# Patient Record
Sex: FEMALE | Race: AMERICAN INDIAN OR ALASKA NATIVE
[De-identification: names, ages, dates, MRNs, and addresses within clinical notes are randomized per-mention and may not be internally consistent; named-entity substitution may affect disease eponyms.]

---

## 2019-04-22 ENCOUNTER — HOSPITAL ENCOUNTER (INPATIENT)
Dept: HOSPITAL 5 - LD | Age: 29
LOS: 4 days | Discharge: HOME | End: 2019-04-26
Attending: OBSTETRICS & GYNECOLOGY | Admitting: OBSTETRICS & GYNECOLOGY
Payer: COMMERCIAL

## 2019-04-22 DIAGNOSIS — Z3A.40: ICD-10-CM

## 2019-04-22 DIAGNOSIS — F32.9: ICD-10-CM

## 2019-04-22 DIAGNOSIS — Z83.3: ICD-10-CM

## 2019-04-22 DIAGNOSIS — K61.0: ICD-10-CM

## 2019-04-22 LAB
HCT VFR BLD CALC: 37.3 % (ref 30.3–42.9)
HGB BLD-MCNC: 11.6 GM/DL (ref 10.1–14.3)
MCHC RBC AUTO-ENTMCNC: 31 % (ref 30–34)
MCV RBC AUTO: 72 FL (ref 79–97)
PLATELET # BLD: 245 K/MM3 (ref 140–440)
RBC # BLD AUTO: 5.18 M/MM3 (ref 3.65–5.03)

## 2019-04-22 PROCEDURE — 88302 TISSUE EXAM BY PATHOLOGIST: CPT

## 2019-04-22 PROCEDURE — 86592 SYPHILIS TEST NON-TREP QUAL: CPT

## 2019-04-22 PROCEDURE — 36415 COLL VENOUS BLD VENIPUNCTURE: CPT

## 2019-04-22 PROCEDURE — 86850 RBC ANTIBODY SCREEN: CPT

## 2019-04-22 PROCEDURE — 85027 COMPLETE CBC AUTOMATED: CPT

## 2019-04-22 PROCEDURE — 86901 BLOOD TYPING SEROLOGIC RH(D): CPT

## 2019-04-22 PROCEDURE — 0UT70ZZ RESECTION OF BILATERAL FALLOPIAN TUBES, OPEN APPROACH: ICD-10-PCS | Performed by: OBSTETRICS & GYNECOLOGY

## 2019-04-22 PROCEDURE — 86900 BLOOD TYPING SEROLOGIC ABO: CPT

## 2019-04-22 PROCEDURE — 85014 HEMATOCRIT: CPT

## 2019-04-22 PROCEDURE — 85018 HEMOGLOBIN: CPT

## 2019-04-22 PROCEDURE — 0Y910ZZ DRAINAGE OF LEFT BUTTOCK, OPEN APPROACH: ICD-10-PCS | Performed by: OBSTETRICS & GYNECOLOGY

## 2019-04-22 RX ADMIN — AMPICILLIN SODIUM SCH MLS/HR: 1 INJECTION, POWDER, FOR SOLUTION INTRAMUSCULAR; INTRAVENOUS at 18:46

## 2019-04-22 RX ADMIN — ACETAMINOPHEN PRN MG: 325 TABLET ORAL at 16:53

## 2019-04-22 RX ADMIN — SODIUM CHLORIDE, SODIUM LACTATE, POTASSIUM CHLORIDE, AND CALCIUM CHLORIDE SCH MLS/HR: .6; .31; .03; .02 INJECTION, SOLUTION INTRAVENOUS at 14:23

## 2019-04-22 RX ADMIN — ACETAMINOPHEN PRN MG: 325 TABLET ORAL at 22:07

## 2019-04-22 RX ADMIN — CEFTRIAXONE SODIUM SCH MLS/HR: 2 INJECTION, POWDER, FOR SOLUTION INTRAMUSCULAR; INTRAVENOUS at 16:54

## 2019-04-22 NOTE — HISTORY AND PHYSICAL REPORT
History of Present Illness


Date of examination: 19 (pt here for IOl; lesion on perineum)


Date of admission: 


19 11:45





History of present illness: 


EDC Confirmation:  2019


Gestational Age:  25 1/7 weeks





Past Pregnancy History 


   :      3


   Term Births:      1


   Premature Births:   0


   Living Children:   1


   Para:         1


   Mult. Births:      0


   Prev :   0


   Prev.  attempt?   0


   Aborta:      1


   Elect. Ab:      0


   Spont. Ab:      1





Pregnancy # 1


   Delivery date:     


   Delivery type:     SAB


   Comments:      D&C





Pregnancy # 2


   Delivery date:     


   Weeks Gestation:   term


    labor:     no


   Delivery type:     


   Infant Sex:      Female


   Birth weight:      6#13








Past Medical History:


   anxeity/depression after first child


   HSV2


   Abnormal pap 10/2018 - colpo done 





Past Surgical History:


   Negative Past Surgical History





Past Medical History 


Surgery (Non-gyn): Negative Past Surgical History





Abnormal PAP: positive, 





Family Hx: mother - DM





Social Hx: Single


no ETOH/drugs/smoking





Infection History 


Hx of STD: HSV 2


HIV Risk Eval: no


Hepatitis B Risk Eval: low risk


Personal hx. of genital herpes: yes


Partner hx. of genital herpes: no


Rash, Viral, or Febrile illness since last LMP? no


Varicella/Chicken Pox Status: Previous Disease





Genetic History 


 Congenital Heart Defect:


    Mom: no  Dad: no


Canavan Disease:


    Mom: no  Dad: no


Thalassemia


    Mom: no  Dad: no


Neural Tube Defect


    Mom: no  Dad: no


Down's Syndrome


    Mom: no  Dad: no


Ricky-Sachs


    Mom: no  Dad: no


Sickle Cell Disease/Trait


    Mom: no  Dad: no


Hemophilia


    Mom: no  Dad: no


Muscular Dystrophy


    Mom: no  Dad: no


Cystic Fibrosis


    Mom: no  Dad: no


Loving Chorea


    Mom: no  Dad: no


Mental Retardation


    Mom: no  Dad: no


Fragile X


    Mom: no  Dad: no


Other Genetic/Chromosomal Disorder


    Mom: no  Dad: no


Child w/other birth defect


    Mom: no  Dad: no





Enviromental Exposures 


Xray Exposure: no


Medication, drug, or alcohol use since LMP: no


Chemical/Other Exposure: no


Exposure to Cat Liter: no


Hx of Parvovirus (Fifth Disease): no


Occupational Exposure to Children: none


Current Allergies (reviewed today):


No known allergies








Past History





- Obstetrical History


Expected Date of Delivery: 19


Actual Gestation: 40 Week(s) 5 Day(s) 


: 3


Para: 1


Hx # Term Pregnancies: 1


Number of  Pregnancies: 0


Spontaneous Abortions: 0


Induced : 0


Number of Living Children: 1





- Vital Signs


Vital signs: 


                                   Vital Signs











Pulse BP


 


 125 H  132/81 


 


 19 12:16  19 12:16








                                        











Temp Pulse Resp BP Pulse Ox


 


    125 H     132/81    


 


    19 12:16     19 12:16   














- Physical Exam


Breasts: Positive: deferred


Cardiovascular: Regular rate, Normal S1, Normal S2


Lungs: Positive: Normal air movement


Abdomen: Positive: normal appearance, soft, normal bowel sounds.  Negative: 

distention, tenderness


Genitourinary (Female): Positive: other (there is a lesion on her left buttock 

that measures approx 2-3cm X 1.5 cm)


Vulva: both: normal


Vagina: Positive: normal moisture.  Negative: discharge


Cervix: Negative: lesion, discharge


Uterus: Positive: normal size, normal contour


Adnexa: both: normal


Anus/Rectum: Positive: normal perianal skin, heme negative.  Negative: rectal 

mass, hemorrhoids


Extremities: Positive: normal


Deep Tendon Reflex Grade: Normal +2





- Obstetrical


FHR: category 1


Uterine Contraction Monitor Mode: External


Uterine Contraction Pattern: Irregular


Uterine Tone Measurement Phase: Resting


Uterine Contraction Intensity: Mild





Results


All other labs normal.


Laboratory Data-Patient Name: JAMES SALAZAR               











Test Date Result


 


Blood Type 10/22/2018 O


 


Rh 10/22/2018 positive


 


Antibody Screen  


 


Rubella 10/22/2018 IMMUNE


 


Serology ([facility]PR) 2019 NR


 


HBsAg 10/22/2018 negative


 


Hemoglobin 10/22/2018 11.1


 


Hematocrit 10/22/2018 37.9


 


Platelets 10/22/2018 253


 


Chlamydia DNA 10/20/2018 negative


 


GC DNA/Culture 10/20/2018 


 


Urine Culture  


 


Group B Strep cult  POSITIVE


 


PAP 10/20/2018 Satisfactory, LSIL


 


HIV 2019 Negative


 


AFP/Quad Screen  


 


Glucola Test  


 


3hr GTT (Fasting)  


 


  1 hr 2018 121


 


  2 hr  


 


  3 hr  











Assessment and Plan


pt sent from office for IOL after being seen for her OB visit. Pt also has a l

esion on her left buttock Noted by . IV Rocephin ordered. Surgery 

consult placed GBS + Ampicillin ordered. Orders in EMR

## 2019-04-22 NOTE — CONSULTATION
History of Present Illness


Consult date: 04/22/19


Reason for consult: other (abscess)


Requesting physician: PACO CHA


Chief complaint: 





perineal pain and swelling





- History of present illness


History of present illness: 





29yo F, otherwise healthy, was admitted due to abscess in left buttock and plans

for induction.  Patient reports the pain and swelling have been present for at 

least one week.  She thought it was a cyst.  It has persisted and gotten worse. 

Denies any fevers or chills.  She is not had anything like this before.





Past History


Past Medical History: No medical history


Past Surgical History: No surgical history


Social history: denies: smoking, alcohol abuse


Family history: no significant family history





Medications and Allergies


                                    Allergies











Allergy/AdvReac Type Severity Reaction Status Date / Time


 


No Known Allergies Allergy   Unverified 04/22/19 13:34











Active Meds: 


Active Medications





Acetaminophen (Tylenol)  650 mg PO Q4H PRN


   PRN Reason: Pain, Mild (1-3)


   Last Admin: 04/22/19 16:53 Dose:  650 mg


   Documented by: 


Benzocaine/Menthol (Dermoplast)  1 spray TP PRN PRN


   PRN Reason: Vaginal Irritation


Ephedrine Sulfate (Ephedrine Sulfate)  10 mg IV Q2M PRN


   PRN Reason: Hypotension


Fentanyl (Sublimaze)  100 mcg IV Q2H PRN


   PRN Reason: Labor Pain


Ampicillin Sodium (Ampicillin/Ns 1 Gm/50 Ml)  1 gm in 50 mls @ 100 mls/hr IV 

Q4HR ONEYDA; Protocol


   Last Admin: 04/22/19 18:46 Dose:  100 mls/hr


   Documented by: 


Ceftriaxone Sodium (Rocephin/Ns 2 Gm/100 Ml)  2 gm in 100 mls @ 200 mls/hr IV 

Q24HR ONEYDA; Protocol


   Last Admin: 04/22/19 16:54 Dose:  200 mls/hr


   Documented by: 


Lactated Ringer's (Lactated Ringers)  1,000 mls @ 125 mls/hr IV AS DIRECT ONEYDA


   Last Admin: 04/22/19 14:23 Dose:  125 mls/hr


   Documented by: 


Oxytocin/Sodium Chloride (Pitocin/Ns 20 Unit/1000ml Drip)  20 units in 1,000 mls

@ 125 mls/hr IV AS DIRECT ONEYDA


Oxytocin/Sodium Chloride (Pitocin/Ns 30 Unit/500ml)  30 units in 500 mls @ 4 

mls/hr IV AS DIRECT ONEYDA; Protocol


   Last Admin: 04/22/19 14:31 Dose:  2 mls/hr, 2 mls/hr


   Documented by: 


Mineral Oil (Mineral Oil)  30 ml PO QHS PRN


   PRN Reason: Constipation


Ondansetron HCl (Zofran)  4 mg IV Q8H PRN


   PRN Reason: Nausea And Vomiting


Terbutaline Sulfate (Brethine)  0.25 mg SUB-Q ONCE PRN


   PRN Reason: Hyperstimulation/Hypertonicity


Terbutaline Sulfate (Brethine)  0.25 mg IVP ONCE PRN


   PRN Reason: Hyperstimulation/Hypertonicity











Review of Systems





- Constitutional


no fever, no chills, no chronic pain





- Cardiovascular


no chest pain





- Respiratory


no cough





- Gastrointestinal


no abdominal pain





- Integumentary


boils





- Psychiatric


depression





Exam


                                   Vital Signs











Temp Pulse BP


 


 98.6 F   125 H  132/81 


 


 04/22/19 12:16  04/22/19 12:16  04/22/19 12:16














- General physical appearance


Positive: no distress, obese





- Eyes


Positive: normal occular movement





- Respiratory


Positive: normal expansion, normal respiratory effort





- Integumentary


other (1cm fluctuant area in left perineal area with surrounding induration. 

+warmth. +tenderness. No extension to labia. No active drainage)





- Neurologic


Neurologic: alert and oriented to time, place and person, motor strength and 

sensation are grossly intact





- Psychiatric


Psychiatric: appropriate mood/affect, intact judgment & insight





Results





- Labs





                                 04/22/19 13:21





                              Abnormal lab results











  04/22/19 Range/Units





  13:21 


 


WBC  13.9 H  (4.5-11.0)  K/mm3


 


RBC  5.18 H  (3.65-5.03)  M/mm3


 


MCV  72 L  (79-97)  fl


 


MCH  23 L  (28-32)  pg


 


RDW  17.4 H  (13.2-15.2)  %














Assessment and Plan





- Patient Problems


(1) Abscess of perineum


Current Visit: Yes   Status: Acute   


Plan to address problem: 


Pt stable.  Discussed plan with Dr. Cha.  They are planning to begin 

induction tonight.  After the delivery is complete, they will notify me so that 

I can make arrangements for a bedside I&D.  This was explained to the patient.  

She was in agreement with the plan.  Will obtain written consent at the time of 

the I&D.





Please call with questions. 





Time=30min

## 2019-04-23 RX ADMIN — CEFTRIAXONE SODIUM SCH: 2 INJECTION, POWDER, FOR SOLUTION INTRAMUSCULAR; INTRAVENOUS at 13:52

## 2019-04-23 RX ADMIN — SODIUM CHLORIDE, SODIUM LACTATE, POTASSIUM CHLORIDE, AND CALCIUM CHLORIDE SCH MLS/HR: .6; .31; .03; .02 INJECTION, SOLUTION INTRAVENOUS at 12:45

## 2019-04-23 RX ADMIN — DEXTROSE, SODIUM CHLORIDE, SODIUM LACTATE, POTASSIUM CHLORIDE, AND CALCIUM CHLORIDE SCH MLS/HR: 5; .6; .31; .03; .02 INJECTION, SOLUTION INTRAVENOUS at 22:54

## 2019-04-23 RX ADMIN — CEFTRIAXONE SODIUM SCH MLS/HR: 2 INJECTION, POWDER, FOR SOLUTION INTRAMUSCULAR; INTRAVENOUS at 13:52

## 2019-04-23 RX ADMIN — ACETAMINOPHEN PRN MG: 325 TABLET ORAL at 04:30

## 2019-04-23 RX ADMIN — SODIUM CHLORIDE, SODIUM LACTATE, POTASSIUM CHLORIDE, AND CALCIUM CHLORIDE SCH MLS/HR: .6; .31; .03; .02 INJECTION, SOLUTION INTRAVENOUS at 09:38

## 2019-04-23 RX ADMIN — AMPICILLIN SODIUM SCH MLS/HR: 1 INJECTION, POWDER, FOR SOLUTION INTRAMUSCULAR; INTRAVENOUS at 10:41

## 2019-04-23 RX ADMIN — KETOROLAC TROMETHAMINE PRN MG: 30 INJECTION, SOLUTION INTRAMUSCULAR at 23:00

## 2019-04-23 NOTE — OPERATIVE REPORT
Operative Report


Operative Report: 


Date of procedure: 2019





Pre-operative diagnosis: Intrauterine pregnancy at 40 weeks and 6 days with arre

st of dilatation and descent and desires permanent sterilization.  Perianal 

abscess 





Post-operative diagnosis: Same





Procedure name(s): Primary  section with bilateral tubal ligation 

modified Perris type plus packing of perianal abscess





Surgeon: Demetrio Melendez MD





Assistant: Virginia Soler, certified nurse midwife





Anesthesia: Epidural





EBL: 800 mL





Complications: None





Findings: Normal uterus tubes and ovaries bilaterally.  Meconium stained fluid. 

Female infant Apgars 4 at 1 minute and 7 at 5 minutes.





Specimen(s): Portion of right and left fallopian tubes





Procedure: The patient was brought to the operating room.  Her epidural was 

dosed without any complications.  She was then placed in left lateral tilt.  

Prepped and draped in the usual sterile manner.  After testing for adequate 

anesthesia level, a Pfannenstiel incision was made. This incision was taken down

to the fascia.  The fascia was then nicked in the midline.  This incision was 

extended out laterally with Gonzalez scissors.  The fascia was then  

sharply and bluntly from the underlying rectus muscles.  The rectus muscles were

bluntly and sharply .  The peritoneum was then entered with the 

's fingers.  This incision was spread vertically with care not to damage

the bladder below. The Amos self-retaining tractor was then placed without any

difficulty.  The bladder flap was then formed sharply and bluntly with 

Metzenbaum scissors.  A transverse incision was made in lower uterine segment.  

This incision was extended laterally with the operators fingers.  The amniotic 

sac was then entered bluntly with the 's fingers.  The infant was 

delivered from the vertex position.  Bulb suction on the mother's abdomen.  Cord

was double clamped and cut.  The infant was then passed to the nursery personnel

who were in attendance.  The above Apgar scores were given by the nursery 

personnel.  The placenta was then bluntly removed.  The uterus was then 

externalized and wiped clean the remaining products.  The uterine incision was 

closed in layers.  The first incision was closed in a locking manner using 0 

Vicryl.  This was followed by imbricating stitch also with 0 Vicryl.  Attention 

was then switched to the patient's fallopian tubes.  Each fallopian tube was 

identified by its fimbriated end.  A portion of each tube was grabbed with the 

Hurst clamp approximately 2-3 cm from the cornua.  Each loop was double 

ligated with 0 plain suture.  The loop were cut with Metzenbaum scissors.  Each 

stump was found to be hemostatic and cauterized with the Bovie.  Attention was 

then switched back to the uterine closure. This closure was hemostatic.  The 

bladder flap was copiously irrigated and found to be hemostatic. The pelvis was 

copiously irrigated and found to be hemostatic.  The uterus was then placed back

to the patient's abdomen.  The retractors were removed.  The rectus muscles were

inspected and found to be hemostatic.  The fascia was then closed in a running 

manner using 0 Vicryl.  This incision was hemostatic irrigation Bovie.  The skin

was reapproximated with 4-0 Vicryl subcuticularly.  Attention was then turned to

the patient's left buttocks where patient had a spontaneously draining abscess. 

The abscess was probed with a hemostat with expressing a minimal amount of pus. 

The abscess was then packed with one-inch gauze.  The area did have significant 

induration. The patient tolerated procedure well.  Her urine was clear.  The 

infant was admitted to the intensive care nursery for observation. The patient 

was accompanied to recovery room in good condition.  Instrument count correct 

3.

## 2019-04-23 NOTE — PROGRESS NOTE
Assessment and Plan


 Pt has not made any progress in dilatation or decent since noon despite 

adequate labor and frequent position change. FHT CAT 1 with early decels. EFW 8+

lbs. DWP advisement for operative delivery d/t lack of cervical change and 

decent with adequate labor. Reviewed c/s is major abd surgery; possible risk for

injury to surrounding organs and blood loss. Pitocin turned off. pt given time 

to discuss plan with family.








- Patient Problems


(1) 40 weeks gestation of pregnancy


Current Visit: Yes   Status: Acute   





(2) Abscess of perineum


Current Visit: Yes   Status: Acute   





Subjective





- Subjective


Date of service: 04/23/19


Principal diagnosis: IUP @ 40+6, IOL, perianal abcess


Patient reports: no new complaints (comfortable with epidural, feeling rectal 

pressure)





Objective





- Vital Signs


Vital Signs: 


                               Vital Signs - 12hr











  04/23/19 04/23/19 04/23/19





  06:40 07:21 09:18


 


Temperature  97.1 F L 


 


Pulse Rate 86 82 100 H


 


Respiratory  20 





Rate   


 


Blood Pressure 124/79 112/57 133/74


 


O2 Sat by Pulse   





Oximetry   














  04/23/19 04/23/19 04/23/19





  09:29 09:34 09:39


 


Temperature   


 


Pulse Rate 107 H 101 H 87


 


Respiratory   





Rate   


 


Blood Pressure   


 


O2 Sat by Pulse 98 100 100





Oximetry   














  04/23/19 04/23/19 04/23/19





  09:44 09:49 09:54


 


Temperature   


 


Pulse Rate 111 H 109 H 103 H


 


Respiratory   





Rate   


 


Blood Pressure   


 


O2 Sat by Pulse 97 99 98





Oximetry   














  04/23/19 04/23/19 04/23/19





  09:59 10:15 10:20


 


Temperature   


 


Pulse Rate 107 H  115 H


 


Respiratory   





Rate   


 


Blood Pressure   


 


O2 Sat by Pulse 100 100 98





Oximetry   














  04/23/19 04/23/19 04/23/19





  10:25 10:28 10:30


 


Temperature   


 


Pulse Rate 95 H 105 H 123 H


 


Respiratory   





Rate   


 


Blood Pressure  134/61 134/64


 


O2 Sat by Pulse 97  98





Oximetry   














  04/23/19 04/23/19 04/23/19





  10:32 10:34 10:35


 


Temperature   


 


Pulse Rate 112 H 111 H 118 H


 


Respiratory   





Rate   


 


Blood Pressure 127/63 125/63 


 


O2 Sat by Pulse   97





Oximetry   














  04/23/19 04/23/19 04/23/19





  10:36 10:40 10:42


 


Temperature   


 


Pulse Rate 112 H 128 H 117 H


 


Respiratory   





Rate   


 


Blood Pressure 119/62 117/56 113/57


 


O2 Sat by Pulse  96 





Oximetry   














  04/23/19 04/23/19 04/23/19





  10:44 10:45 10:48


 


Temperature   


 


Pulse Rate 126 H 130 H 121 H


 


Respiratory   





Rate   


 


Blood Pressure 88/49 97/46 98/44


 


O2 Sat by Pulse  99 





Oximetry   














  04/23/19 04/23/19 04/23/19





  10:50 10:52 10:54


 


Temperature   


 


Pulse Rate 127 H 148 H 125 H


 


Respiratory   





Rate   


 


Blood Pressure 106/52 94/53 107/61


 


O2 Sat by Pulse 100  





Oximetry   














  04/23/19 04/23/19 04/23/19





  10:55 10:56 10:58


 


Temperature   


 


Pulse Rate 101 H  100 H


 


Respiratory   





Rate   


 


Blood Pressure  109/58 117/58


 


O2 Sat by Pulse 100  





Oximetry   














  04/23/19 04/23/19 04/23/19





  11:00 11:02 11:04


 


Temperature   


 


Pulse Rate 124 H 108 H 


 


Respiratory   





Rate   


 


Blood Pressure 113/56 124/58 126/59


 


O2 Sat by Pulse 100  





Oximetry   














  04/23/19 04/23/19 04/23/19





  11:05 11:06 11:08


 


Temperature   


 


Pulse Rate 117 H 122 H 141 H


 


Respiratory   





Rate   


 


Blood Pressure  110/55 111/59


 


O2 Sat by Pulse 100  





Oximetry   














  04/23/19 04/23/19 04/23/19





  11:10 11:12 11:14


 


Temperature   97.7 F


 


Pulse Rate 111 H 121 H 105 H


 


Respiratory   





Rate   


 


Blood Pressure 121/60 113/62 113/66


 


O2 Sat by Pulse 100  100





Oximetry   














  04/23/19 04/23/19 04/23/19





  11:15 11:16 11:18


 


Temperature   


 


Pulse Rate 124 H 123 H 130 H


 


Respiratory   





Rate   


 


Blood Pressure  118/72 110/56


 


O2 Sat by Pulse 100  





Oximetry   














  04/23/19 04/23/19 04/23/19





  11:20 11:21 11:22


 


Temperature   


 


Pulse Rate 118 H 120 H 106 H


 


Respiratory   





Rate   


 


Blood Pressure  119/58 121/59


 


O2 Sat by Pulse 100  





Oximetry   














  04/23/19 04/23/19 04/23/19





  11:24 11:25 11:26


 


Temperature   


 


Pulse Rate 130 H 142 H 133 H


 


Respiratory   





Rate   


 


Blood Pressure 115/54  92/57


 


O2 Sat by Pulse  100 





Oximetry   














  04/23/19 04/23/19 04/23/19





  11:28 11:30 11:32


 


Temperature   


 


Pulse Rate 112 H 106 H 118 H


 


Respiratory   





Rate   


 


Blood Pressure 105/61 109/59 115/60


 


O2 Sat by Pulse  99 





Oximetry   














  04/23/19 04/23/19 04/23/19





  11:34 11:35 11:36


 


Temperature   


 


Pulse Rate 127 H 100 H 108 H


 


Respiratory   





Rate   


 


Blood Pressure 111/59  117/63


 


O2 Sat by Pulse  100 





Oximetry   














  04/23/19 04/23/19 04/23/19





  11:38 11:40 11:42


 


Temperature   


 


Pulse Rate 114 H 134 H 106 H


 


Respiratory   





Rate   


 


Blood Pressure 117/62 107/53 123/60


 


O2 Sat by Pulse  99 





Oximetry   














  04/23/19 04/23/19 04/23/19





  11:44 11:45 11:46


 


Temperature   


 


Pulse Rate 112 H 116 H 113 H


 


Respiratory   





Rate   


 


Blood Pressure 119/61  119/56


 


O2 Sat by Pulse  100 





Oximetry   














  04/23/19 04/23/19 04/23/19





  11:48 11:50 11:52


 


Temperature   


 


Pulse Rate 127 H 109 H 116 H


 


Respiratory   





Rate   


 


Blood Pressure 114/57 120/61 121/60


 


O2 Sat by Pulse  99 





Oximetry   














  04/23/19 04/23/19 04/23/19





  11:54 11:55 11:56


 


Temperature   


 


Pulse Rate 117 H 129 H 104 H


 


Respiratory   





Rate   


 


Blood Pressure 122/57  127/60


 


O2 Sat by Pulse  100 





Oximetry   














  04/23/19 04/23/19 04/23/19





  11:58 12:00 12:02


 


Temperature   


 


Pulse Rate 116 H 119 H 121 H


 


Respiratory   





Rate   


 


Blood Pressure 130/62 135/62 144/64


 


O2 Sat by Pulse  100 





Oximetry   














  04/23/19 04/23/19 04/23/19





  12:04 12:05 12:06


 


Temperature   


 


Pulse Rate 108 H 120 H 116 H


 


Respiratory   





Rate   


 


Blood Pressure 137/63  138/65


 


O2 Sat by Pulse  99 





Oximetry   














  04/23/19 04/23/19 04/23/19





  12:08 12:10 12:12


 


Temperature   


 


Pulse Rate 116 H 111 H 110 H


 


Respiratory   





Rate   


 


Blood Pressure 143/70 138/65 126/73


 


O2 Sat by Pulse  99 





Oximetry   














  04/23/19 04/23/19 04/23/19





  12:14 12:15 12:17


 


Temperature   


 


Pulse Rate 116 H 142 H 113 H


 


Respiratory   





Rate   


 


Blood Pressure 128/75  109/60


 


O2 Sat by Pulse  96 





Oximetry   














  04/23/19 04/23/19 04/23/19





  12:20 12:25 12:30


 


Temperature   


 


Pulse Rate 113 H 112 H 122 H


 


Respiratory   





Rate   


 


Blood Pressure 106/56  


 


O2 Sat by Pulse 100 99 98





Oximetry   














  04/23/19 04/23/19 04/23/19





  12:35 12:40 12:45


 


Temperature   


 


Pulse Rate 120 H 118 H 111 H


 


Respiratory   





Rate   


 


Blood Pressure   


 


O2 Sat by Pulse 99 99 99





Oximetry   














  04/23/19 04/23/19 04/23/19





  12:46 12:50 12:51


 


Temperature 97.8 F  


 


Pulse Rate  111 H 111 H


 


Respiratory 20  





Rate   


 


Blood Pressure   117/61


 


O2 Sat by Pulse  99 





Oximetry   














  04/23/19 04/23/19 04/23/19





  12:55 13:00 13:05


 


Temperature   


 


Pulse Rate 105 H 113 H 124 H


 


Respiratory   





Rate   


 


Blood Pressure   


 


O2 Sat by Pulse 98 99 100





Oximetry   














  04/23/19 04/23/19 04/23/19





  13:10 13:15 13:20


 


Temperature   


 


Pulse Rate 114 H 112 H 115 H


 


Respiratory   





Rate   


 


Blood Pressure   


 


O2 Sat by Pulse 99 100 100





Oximetry   














  04/23/19 04/23/19 04/23/19





  13:22 13:25 13:40


 


Temperature   


 


Pulse Rate 111 H 109 H 117 H


 


Respiratory   





Rate   


 


Blood Pressure 135/68  


 


O2 Sat by Pulse  100 100





Oximetry   














  04/23/19 04/23/19 04/23/19





  13:45 13:50 13:52


 


Temperature   


 


Pulse Rate 113 H 116 H 112 H


 


Respiratory   





Rate   


 


Blood Pressure   127/81


 


O2 Sat by Pulse 100 100 





Oximetry   














  04/23/19 04/23/19 04/23/19





  13:55 14:00 14:05


 


Temperature   


 


Pulse Rate 109 H 115 H 115 H


 


Respiratory   





Rate   


 


Blood Pressure   


 


O2 Sat by Pulse 100 99 100





Oximetry   














  04/23/19 04/23/19 04/23/19





  14:10 14:15 14:20


 


Temperature   


 


Pulse Rate 114 H 123 H 114 H


 


Respiratory   





Rate   


 


Blood Pressure   


 


O2 Sat by Pulse 100 100 100





Oximetry   














  04/23/19 04/23/19 04/23/19





  14:21 14:25 14:30


 


Temperature   


 


Pulse Rate 111 H 117 H 116 H


 


Respiratory   





Rate   


 


Blood Pressure 142/75  


 


O2 Sat by Pulse  100 99





Oximetry   














  04/23/19 04/23/19 04/23/19





  14:35 14:40 14:45


 


Temperature   


 


Pulse Rate 129 H 111 H 109 H


 


Respiratory   





Rate   


 


Blood Pressure   


 


O2 Sat by Pulse 100 100 99





Oximetry   














  04/23/19 04/23/19 04/23/19





  14:50 14:53 14:55


 


Temperature   


 


Pulse Rate 108 H 108 H 108 H


 


Respiratory   





Rate   


 


Blood Pressure  115/60 


 


O2 Sat by Pulse 99  99





Oximetry   














  04/23/19 04/23/19 04/23/19





  15:00 15:05 15:10


 


Temperature   


 


Pulse Rate 113 H 104 H 102 H


 


Respiratory   





Rate   


 


Blood Pressure   


 


O2 Sat by Pulse 98 99 99





Oximetry   














  04/23/19 04/23/19 04/23/19





  15:15 15:20 15:23


 


Temperature   


 


Pulse Rate 106 H 107 H 107 H


 


Respiratory   





Rate   


 


Blood Pressure   125/61


 


O2 Sat by Pulse 98 99 





Oximetry   














  04/23/19 04/23/19 04/23/19





  15:25 15:30 15:35


 


Temperature   


 


Pulse Rate 103 H 109 H 98 H


 


Respiratory   





Rate   


 


Blood Pressure   


 


O2 Sat by Pulse 99 100 100





Oximetry   














  04/23/19 04/23/19 04/23/19





  15:40 15:45 15:50


 


Temperature   


 


Pulse Rate 99 H 107 H 101 H


 


Respiratory   





Rate   


 


Blood Pressure   


 


O2 Sat by Pulse 100 100 100





Oximetry   














  04/23/19 04/23/19 04/23/19





  15:51 15:55 16:00


 


Temperature   


 


Pulse Rate 108 H 102 H 108 H


 


Respiratory   





Rate   


 


Blood Pressure 134/88  


 


O2 Sat by Pulse  100 100





Oximetry   














  04/23/19 04/23/19 04/23/19





  16:05 16:10 16:15


 


Temperature   


 


Pulse Rate 107 H 117 H 103 H


 


Respiratory   





Rate   


 


Blood Pressure   


 


O2 Sat by Pulse 100 100 100





Oximetry   














  04/23/19 04/23/19 04/23/19





  16:20 16:22 16:25


 


Temperature   


 


Pulse Rate 100 H 112 H 112 H


 


Respiratory   





Rate   


 


Blood Pressure  127/81 


 


O2 Sat by Pulse 99  100





Oximetry   














  04/23/19 04/23/19 04/23/19





  16:30 16:35 16:40


 


Temperature   


 


Pulse Rate 110 H 123 H 125 H


 


Respiratory   





Rate   


 


Blood Pressure   


 


O2 Sat by Pulse 100 99 99





Oximetry   














  04/23/19 04/23/19 04/23/19





  16:45 16:50 16:52


 


Temperature   


 


Pulse Rate 122 H 125 H 120 H


 


Respiratory   





Rate   


 


Blood Pressure   117/78


 


O2 Sat by Pulse 100 97 





Oximetry   














- Exam


Breasts: normal


Cardiovascular: Regular rate


Lungs: Clear to auscultation, Normal air movement


Abdomen: Present: normal appearance, soft


Vulva: both: normal (perianal cyst now with small amount of purulent drainage)


Uterus: Present: normal


FHR: auscultation normal, category 1


Uterine Contraction Monitor Mode: Internal


Cervical Dilatation: 7


Cervical Effacement Percentage: 80


Fetal station: -1


Uterine Contraction Frequency (min): 2


Uterine Contraction Duration: 60


Uterine Contraction Pattern: Regular


Uterine Tone Measurement Phase: Contraction


Uterine Contraction Intensity: Strong/Firm





- Labs


Labs: 


                                  Abnormal Labs











  04/22/19





  13:21


 


WBC  13.9 H


 


RBC  5.18 H


 


MCV  72 L


 


MCH  23 L


 


RDW  17.4 H








                         Laboratory Results - last 24 hr











  04/22/19





  13:21


 


RPR  Nonreactive

## 2019-04-23 NOTE — ANESTHESIA CONSULTATION
Anesthesia Consult and Med Hx





- Airway


Anesthetic Teeth Evaluation: Good


ROM Head & Neck: Adequate


Mental/Hyoid Distance: Adequate


Mallampati Class: Class II


Intubation Access Assessment: Probably Good





- Pulmonary Exam


CTA: Yes





- Cardiac Exam


Cardiac Exam: RRR





- Pre-Operative Health Status


ASA Pre-Surgery Classification: ASA2


Proposed Anesthetic Plan: Epidural





- Pulmonary


Hx Asthma: No


COPD: No


Hx Pneumonia: No





- Cardiovascular System


Hx Hypertension: No





- Central Nervous System


Hx Seizures: No


Hx Psychiatric Problems: No





- Endocrine


Hx Renal Disease: No


Hx End Stage Renal Disease: No


Hx Hypothyroidism: No


Hx Hyperthyroidism: No





- Hematic


Hx Anemia: Yes


Hx Sickle Cell Disease: No





- Other Systems


Hx Alcohol Use: No

## 2019-04-23 NOTE — PROGRESS NOTE
Assessment and Plan


 Cervidil removed - SVE 4/70/-3, IBOW. Plan reviewed to start pitocin and 

epidural. Will call surgery once pt delivered for I&D of abcess.  Plan of care 

discussed with patient. 








- Patient Problems


(1) 40 weeks gestation of pregnancy


Current Visit: Yes   Status: Acute   





(2) Abscess of perineum


Current Visit: Yes   Status: Acute   





Subjective





- Subjective


Date of service: 04/23/19


Principal diagnosis: IUP @ 40+6, IOL, perianal abcess


Patient reports: fetal movement normal, contractions, no loss of fluid, no 

vaginal bleeding





Objective





- Vital Signs


Vital Signs: 


                               Vital Signs - 12hr











  04/22/19 04/23/19 04/23/19





  20:42 06:40 07:21


 


Temperature   97.1 F L


 


Pulse Rate 105 H 86 82


 


Blood Pressure 110/59 124/79 112/57














- Exam


Breasts: normal


Cardiovascular: Regular rate


Lungs: Clear to auscultation, Normal air movement


Abdomen: Present: normal appearance, soft


Vulva: left: normal (abcess on left side of vulva - white head formed on top of 

abcess)


Uterus: Present: normal


FHR: auscultation normal, category 1


Uterine Contraction Monitor Mode: External


Cervical Dilatation: 4


Cervical Effacement Percentage: 70


Fetal station: -3


Uterine Contraction Frequency (min): 2-4


Uterine Contraction Duration: 60


Uterine Contraction Pattern: Regular


Uterine Tone Measurement Phase: Contraction


Uterine Contraction Intensity: Moderate


Extremities: normal


Deep Tendon Reflex Grade: Normal +2





- Labs


Labs: 


                                  Abnormal Labs











  04/22/19





  13:21


 


WBC  13.9 H


 


RBC  5.18 H


 


MCV  72 L


 


MCH  23 L


 


RDW  17.4 H








                         Laboratory Results - last 24 hr











  04/22/19 04/22/19





  13:21 13:21


 


WBC  13.9 H 


 


RBC  5.18 H 


 


Hgb  11.6 


 


Hct  37.3 


 


MCV  72 L 


 


MCH  23 L 


 


MCHC  31 


 


RDW  17.4 H 


 


Plt Count  245 


 


Blood Type   O POSITIVE


 


Antibody Screen   Negative

## 2019-04-23 NOTE — EVENT NOTE
Date: 04/23/19


 perianal abcess ruptured, white/yellow purulent drainage noted. Pit @ 8mU, FHT 

CAT1 with early decels.

## 2019-04-23 NOTE — ANESTHESIA DAY OF SURGERY
Anesthesia Day of Surgery





- Day of Surgery


Patient Examined: Yes


Patient H&P Reviewed: Yes


Patient is NPO: Yes


Beta Blockers: No


Cardiac Clearance: No


Pulmonary Clearance: No


Marquise's Test: N/A

## 2019-04-23 NOTE — PROGRESS NOTE
Assessment and Plan


 patient comfortable with epidural,  difficult to keep FHT on EFM. AROM - light 

mec fluid noted. ISE and IUPC placed without difficulty. IUPC working well, ISE 

not functioning. baby still @ -2 station. RN to utilize frequent position change

and continue to increase pitocin for adequate labor. 








- Patient Problems


(1) 40 weeks gestation of pregnancy


Current Visit: Yes   Status: Acute   





(2) Abscess of perineum


Current Visit: Yes   Status: Acute   





Subjective





- Subjective


Date of service: 04/23/19


Principal diagnosis: IUP @ 40+6, IOL, perianal abcess


Patient reports: no new complaints (comfortable with epidural)





Objective





- Vital Signs


Vital Signs: 


                               Vital Signs - 12hr











  04/23/19 04/23/19 04/23/19





  06:40 07:21 09:18


 


Temperature  97.1 F L 


 


Pulse Rate 86 82 100 H


 


Respiratory  20 





Rate   


 


Blood Pressure 124/79 112/57 133/74


 


O2 Sat by Pulse   





Oximetry   














  04/23/19 04/23/19 04/23/19





  09:29 09:34 09:39


 


Temperature   


 


Pulse Rate 107 H 101 H 87


 


Respiratory   





Rate   


 


Blood Pressure   


 


O2 Sat by Pulse 98 100 100





Oximetry   














  04/23/19 04/23/19 04/23/19





  09:44 09:49 09:54


 


Temperature   


 


Pulse Rate 111 H 109 H 103 H


 


Respiratory   





Rate   


 


Blood Pressure   


 


O2 Sat by Pulse 97 99 98





Oximetry   














  04/23/19 04/23/19 04/23/19





  09:59 10:15 10:20


 


Temperature   


 


Pulse Rate 107 H  115 H


 


Respiratory   





Rate   


 


Blood Pressure   


 


O2 Sat by Pulse 100 100 98





Oximetry   














  04/23/19 04/23/19 04/23/19





  10:25 10:28 10:30


 


Temperature   


 


Pulse Rate 95 H 105 H 123 H


 


Respiratory   





Rate   


 


Blood Pressure  134/61 134/64


 


O2 Sat by Pulse 97  98





Oximetry   














  04/23/19 04/23/19 04/23/19





  10:32 10:34 10:35


 


Temperature   


 


Pulse Rate 112 H 111 H 118 H


 


Respiratory   





Rate   


 


Blood Pressure 127/63 125/63 


 


O2 Sat by Pulse   97





Oximetry   














  04/23/19 04/23/19 04/23/19





  10:36 10:40 10:42


 


Temperature   


 


Pulse Rate 112 H 128 H 117 H


 


Respiratory   





Rate   


 


Blood Pressure 119/62 117/56 113/57


 


O2 Sat by Pulse  96 





Oximetry   














  04/23/19 04/23/19 04/23/19





  10:44 10:45 10:48


 


Temperature   


 


Pulse Rate 126 H 130 H 121 H


 


Respiratory   





Rate   


 


Blood Pressure 88/49 97/46 98/44


 


O2 Sat by Pulse  99 





Oximetry   














  04/23/19 04/23/19 04/23/19





  10:50 10:52 10:54


 


Temperature   


 


Pulse Rate 127 H 148 H 125 H


 


Respiratory   





Rate   


 


Blood Pressure 106/52 94/53 107/61


 


O2 Sat by Pulse 100  





Oximetry   














  04/23/19 04/23/19 04/23/19





  10:55 10:56 10:58


 


Temperature   


 


Pulse Rate 101 H  100 H


 


Respiratory   





Rate   


 


Blood Pressure  109/58 117/58


 


O2 Sat by Pulse 100  





Oximetry   














  04/23/19 04/23/19 04/23/19





  11:00 11:02 11:04


 


Temperature   


 


Pulse Rate 124 H 108 H 


 


Respiratory   





Rate   


 


Blood Pressure 113/56 124/58 126/59


 


O2 Sat by Pulse 100  





Oximetry   














  04/23/19 04/23/19 04/23/19





  11:05 11:06 11:08


 


Temperature   


 


Pulse Rate 117 H 122 H 141 H


 


Respiratory   





Rate   


 


Blood Pressure  110/55 111/59


 


O2 Sat by Pulse 100  





Oximetry   














  04/23/19 04/23/19 04/23/19





  11:10 11:12 11:14


 


Temperature   97.7 F


 


Pulse Rate 111 H 121 H 105 H


 


Respiratory   





Rate   


 


Blood Pressure 121/60 113/62 113/66


 


O2 Sat by Pulse 100  100





Oximetry   














  04/23/19 04/23/19 04/23/19





  11:15 11:16 11:18


 


Temperature   


 


Pulse Rate 124 H 123 H 130 H


 


Respiratory   





Rate   


 


Blood Pressure  118/72 110/56


 


O2 Sat by Pulse 100  





Oximetry   














  04/23/19 04/23/19 04/23/19





  11:20 11:21 11:22


 


Temperature   


 


Pulse Rate 118 H 120 H 106 H


 


Respiratory   





Rate   


 


Blood Pressure  119/58 121/59


 


O2 Sat by Pulse 100  





Oximetry   














  04/23/19 04/23/19 04/23/19





  11:24 11:25 11:26


 


Temperature   


 


Pulse Rate 130 H 142 H 133 H


 


Respiratory   





Rate   


 


Blood Pressure 115/54  92/57


 


O2 Sat by Pulse  100 





Oximetry   














  04/23/19 04/23/19 04/23/19





  11:28 11:30 11:32


 


Temperature   


 


Pulse Rate 112 H 106 H 118 H


 


Respiratory   





Rate   


 


Blood Pressure 105/61 109/59 115/60


 


O2 Sat by Pulse  99 





Oximetry   














  04/23/19 04/23/19 04/23/19





  11:34 11:35 11:36


 


Temperature   


 


Pulse Rate 127 H 100 H 108 H


 


Respiratory   





Rate   


 


Blood Pressure 111/59  117/63


 


O2 Sat by Pulse  100 





Oximetry   














  04/23/19 04/23/19 04/23/19





  11:38 11:40 11:42


 


Temperature   


 


Pulse Rate 114 H 134 H 106 H


 


Respiratory   





Rate   


 


Blood Pressure 117/62 107/53 123/60


 


O2 Sat by Pulse  99 





Oximetry   














  04/23/19 04/23/19 04/23/19





  11:44 11:45 11:46


 


Temperature   


 


Pulse Rate 112 H 116 H 113 H


 


Respiratory   





Rate   


 


Blood Pressure 119/61  119/56


 


O2 Sat by Pulse  100 





Oximetry   














  04/23/19 04/23/19 04/23/19





  11:48 11:50 11:52


 


Temperature   


 


Pulse Rate 127 H 109 H 116 H


 


Respiratory   





Rate   


 


Blood Pressure 114/57 120/61 121/60


 


O2 Sat by Pulse  99 





Oximetry   














  04/23/19 04/23/19 04/23/19





  11:54 11:55 11:56


 


Temperature   


 


Pulse Rate 117 H 129 H 104 H


 


Respiratory   





Rate   


 


Blood Pressure 122/57  127/60


 


O2 Sat by Pulse  100 





Oximetry   














  04/23/19 04/23/19 04/23/19





  11:58 12:00 12:02


 


Temperature   


 


Pulse Rate 116 H 119 H 121 H


 


Respiratory   





Rate   


 


Blood Pressure 130/62 135/62 144/64


 


O2 Sat by Pulse  100 





Oximetry   














  04/23/19 04/23/19 04/23/19





  12:04 12:05 12:06


 


Temperature   


 


Pulse Rate 108 H 120 H 116 H


 


Respiratory   





Rate   


 


Blood Pressure 137/63  138/65


 


O2 Sat by Pulse  99 





Oximetry   














  04/23/19 04/23/19 04/23/19





  12:08 12:10 12:12


 


Temperature   


 


Pulse Rate 116 H 111 H 110 H


 


Respiratory   





Rate   


 


Blood Pressure 143/70 138/65 126/73


 


O2 Sat by Pulse  99 





Oximetry   














  04/23/19 04/23/19 04/23/19





  12:14 12:15 12:17


 


Temperature   


 


Pulse Rate 116 H 142 H 113 H


 


Respiratory   





Rate   


 


Blood Pressure 128/75  109/60


 


O2 Sat by Pulse  96 





Oximetry   














- Exam


Breasts: normal


Cardiovascular: Regular rate


Lungs: Clear to auscultation, Normal air movement


Abdomen: Present: normal appearance, soft


Vulva: both: normal (perianal abcess)


Uterus: Present: normal


FHR: category 1


Uterine Contraction Monitor Mode: Internal


Cervical Dilatation: 6.5 (AROM - ISE & IUPC)


Cervical Effacement Percentage: 90


Fetal station: -2


Uterine Contraction Frequency (min): 2-4


Uterine Contraction Duration: 60


Uterine Contraction Pattern: Regular


Uterine Tone Measurement Phase: Contraction


Uterine Contraction Intensity: Moderate


Extremities: normal


Deep Tendon Reflex Grade: Normal +2





- Labs


Labs: 


                                  Abnormal Labs











  04/22/19





  13:21


 


WBC  13.9 H


 


RBC  5.18 H


 


MCV  72 L


 


MCH  23 L


 


RDW  17.4 H








                         Laboratory Results - last 24 hr











  04/22/19 04/22/19





  13:21 13:21


 


WBC  13.9 H 


 


RBC  5.18 H 


 


Hgb  11.6 


 


Hct  37.3 


 


MCV  72 L 


 


MCH  23 L 


 


MCHC  31 


 


RDW  17.4 H 


 


Plt Count  245 


 


Blood Type   O POSITIVE


 


Antibody Screen   Negative

## 2019-04-24 LAB
HCT VFR BLD CALC: 31.2 % (ref 30.3–42.9)
HGB BLD-MCNC: 9.7 GM/DL (ref 10.1–14.3)

## 2019-04-24 RX ADMIN — KETOROLAC TROMETHAMINE PRN MG: 30 INJECTION, SOLUTION INTRAMUSCULAR at 09:51

## 2019-04-24 RX ADMIN — KETOROLAC TROMETHAMINE PRN MG: 30 INJECTION, SOLUTION INTRAMUSCULAR at 04:47

## 2019-04-24 RX ADMIN — Medication SCH EACH: at 09:50

## 2019-04-24 RX ADMIN — HYDROCODONE BITARTRATE AND ACETAMINOPHEN PRN EACH: 5; 325 TABLET ORAL at 09:50

## 2019-04-24 RX ADMIN — KETOROLAC TROMETHAMINE PRN MG: 30 INJECTION, SOLUTION INTRAMUSCULAR at 15:32

## 2019-04-24 RX ADMIN — HYDROCODONE BITARTRATE AND ACETAMINOPHEN PRN EACH: 5; 325 TABLET ORAL at 13:10

## 2019-04-24 RX ADMIN — CEFAZOLIN SCH MLS/HR: 330 INJECTION, POWDER, FOR SOLUTION INTRAMUSCULAR; INTRAVENOUS at 09:50

## 2019-04-24 RX ADMIN — HYDROCODONE BITARTRATE AND ACETAMINOPHEN PRN EACH: 5; 325 TABLET ORAL at 19:43

## 2019-04-24 RX ADMIN — SIMETHICONE PRN MG: 80 TABLET, CHEWABLE ORAL at 20:57

## 2019-04-24 RX ADMIN — CEFAZOLIN SCH MLS/HR: 330 INJECTION, POWDER, FOR SOLUTION INTRAMUSCULAR; INTRAVENOUS at 01:33

## 2019-04-24 RX ADMIN — IBUPROFEN PRN MG: 600 TABLET, FILM COATED ORAL at 19:44

## 2019-04-24 RX ADMIN — DEXTROSE, SODIUM CHLORIDE, SODIUM LACTATE, POTASSIUM CHLORIDE, AND CALCIUM CHLORIDE SCH MLS/HR: 5; .6; .31; .03; .02 INJECTION, SOLUTION INTRAVENOUS at 07:35

## 2019-04-24 NOTE — PROGRESS NOTE
Assessment and Plan





- Patient Problems


(1) Abscess of perineum


Current Visit: Yes   Status: Acute   


Plan to address problem: 


Pt stable. Left perineal abscess has drained and was packed by the Gyn team. 

Abscess is much improved. The cavity is deep, but does not track anywhere. Will 

take a few weeks to completely heal. 





Rec: 


1) Sitz baths 2-3x/day


2) replace 1/4" packing tape in the abscess cavity


3) No abx needed unless patient is showing signs of sepsis. 


4) f/u in office 1 week after discharge





Time=10min








Subjective


Date of service: 04/24/19


Patient Reports: Positive: feels better, pain is less (in left perineal area)





Objective


                               Vital Signs - 12hr











  04/24/19 04/24/19





  09:04 17:01


 


Temperature 97.9 F 97.7 F


 


Pulse Rate 82 95 H


 


Respiratory 18 20





Rate  


 


Blood Pressure 133/77 115/74


 


O2 Sat by Pulse 98 96





Oximetry  














- General physical appearance


no distress, no pain





- Respiratory


normal expansion, normal respiratory effort





- Genitourinary


other (left perineal abscess has an opening that is packed with iodoform. No 

purulent drainage. Induration is markedly improved. Much less tender. No tracts.

3-4cm deep cavity)





- Psychiatric


oriented to time, oriented to person, oriented to place, speech is normal, 

memory intact





- Labs





                                 04/24/19 07:25

## 2019-04-24 NOTE — PROGRESS NOTE
Assessment and Plan


POD 1 s/p primary c/s. Patient reports feeling well. Fundus is firm, ML, U/1. 

Vaginal bleeding is small. Abdominal incision is dressed, C/D/I. Perianal 

abscess is packed. Jama catheter remains in place. RN notified to discontinue 

jama and patient requesting dose of ibuprofen for pain at incision site. 

Patient encouraged to get out of bed and ambulate today once jama removed. May 

d/c abdominal dressing in shower. Continue IS. VSSAF. Diet to advance as 

tolerated. Patient reports infant is doing well in NICU. Requesting pump for 

breasts to supply milk for infant. Awaiting consult surgery to assess abscess 

today. RN aware. Continue post op pathway. 








Subjective





- Subjective


Date of service: 19


Principal diagnosis: POD 1 s/p primary c/s, perianal abscess


Patient reports: appetite normal


[place]: in NICU





Objective





- Vital Signs


Latest vital signs: 


                                   Vital Signs











  Temp Pulse Resp BP Pulse Ox


 


 19 04:38  97.7 F  71  18  108/64  96


 


 19 01:07  97.7 F  67  18  108/56  98


 


 19 23:00    18  


 


 19 21:18  97.7 F  110 H  20  119/69  94


 


 19 20:50   110 H  27 H  114/67  96


 


 19 20:45   110 H   117/71 


 


 19 20:40   113 H   116/69 


 


 19 20:35   105 H   116/70 


 


 19 20:30   99 H   115/66 


 


 19 20:25   103 H   121/69 


 


 19 20:10   104 H  27 H  117/68  96


 


 19 19:55   97 H  24  118/55  98


 


 19 19:40   110 H  24  120/56  95


 


 19 19:35   115 H   116/54  95


 


 19 19:30   112 H   106/53  95


 


 19 19:28  98.4 F  116 H  19  105/54  97


 


 19 17:46  98.8 F    


 


 19 17:22   114 H   125/67 


 


 19 17:10   116 H    99


 


 19 17:05   126 H    100


 


 19 17:00   122 H    100


 


 19 16:55   125 H    99


 


 19 16:52   120 H   117/78 


 


 19 16:50   125 H    97


 


 19 16:45   122 H    100


 


 19 16:40   125 H    99


 


 19 16:35   123 H    99


 


 19 16:30   110 H    100


 


 19 16:25   112 H    100


 


 19 16:22   112 H   127/81 


 


 19 16:20   100 H    99


 


 19 16:15   103 H    100


 


 19 16:10   117 H    100


 


 19 16:05   107 H    100


 


 19 16:00   108 H    100


 


 19 15:55   102 H    100


 


 19 15:51   108 H   134/88 


 


 19 15:50   101 H    100


 


 19 15:45   107 H    100


 


 19 15:40   99 H    100


 


 19 15:35   98 H    100


 


 19 15:30   109 H    100


 


 19 15:25   103 H    99


 


 19 15:23   107 H   125/61 


 


 19 15:20   107 H    99


 


 19 15:15   106 H    98


 


 19 15:10   102 H    99


 


 19 15:05   104 H    99


 


 19 15:00   113 H    98


 


 19 14:55   108 H    99


 


 19 14:53   108 H   115/60 


 


 19 14:50   108 H    99


 


 19 14:45   109 H    99


 


 19 14:40   111 H    100


 


 19 14:35   129 H    100


 


 19 14:30   116 H    99


 


 19 14:25   117 H    100


 


 19 14:21   111 H   142/75 


 


 19 14:20   114 H    100


 


 19 14:15   123 H    100


 


 19 14:10   114 H    100


 


 19 14:05   115 H    100


 


 19 14:00   115 H    99


 


 19 13:55   109 H    100


 


 19 13:52   112 H   127/81 


 


 19 13:50   116 H    100


 


 19 13:45   113 H    100


 


 19 13:40   117 H    100


 


 19 13:25   109 H    100


 


 19 13:22   111 H   135/68 


 


 19 13:20   115 H    100


 


 19 13:15   112 H    100


 


 19 13:10   114 H    99


 


 19 13:05   124 H    100


 


 19 13:00   113 H    99


 


 19 12:55   105 H    98


 


 19 12:51   111 H   117/61 


 


 19 12:50   111 H    99


 


 19 12:46  97.8 F   20  


 


 19 12:45   111 H    99


 


 19 12:40   118 H    99


 


 19 12:35   120 H    99


 


 19 12:30   122 H    98


 


 19 12:25   112 H    99


 


 19 12:20   113 H   106/56  100


 


 19 12:17   113 H   109/60 


 


 19 12:15   142 H    96


 


 19 12:14   116 H   128/75 


 


 19 12:12   110 H   126/73 


 


 19 12:10   111 H   138/65  99


 


 19 12:08   116 H   143/70 


 


 19 12:06   116 H   138/65 


 


 19 12:05   120 H    99


 


 19 12:04   108 H   137/63 


 


 19 12:02   121 H   144/64 


 


 19 12:00   119 H   135/62  100


 


 19 11:58   116 H   130/62 


 


 19 11:56   104 H   127/60 


 


 19 11:55   129 H    100


 


 19 11:54   117 H   122/57 


 


 19 11:52   116 H   121/60 


 


 19 11:50   109 H   120/61  99


 


 19 11:48   127 H   114/57 


 


 19 11:46   113 H   119/56 


 


 19 11:45   116 H    100


 


 19 11:44   112 H   119/61 


 


 19 11:42   106 H   123/60 


 


 19 11:40   134 H   107/53  99


 


 19 11:38   114 H   117/62 


 


 19 11:36   108 H   117/63 


 


 19 11:35   100 H    100


 


 19 11:34   127 H   111/59 


 


 19 11:32   118 H   115/60 


 


 19 11:30   106 H   109/59  99


 


 19 11:28   112 H   105/61 


 


 19 11:26   133 H   92/57 


 


 19 11:25   142 H    100


 


 19 11:24   130 H   115/54 


 


 19 11:22   106 H   121/59 


 


 19 11:21   120 H   119/58 


 


 19 11:20   118 H    100


 


 19 11:18   130 H   110/56 


 


 19 11:16   123 H   118/72 


 


 19 11:15   124 H    100


 


 19 11:14  97.7 F  105 H   113/66  100


 


 19 11:12   121 H   113/62 


 


 19 11:10   111 H   121/60  100


 


 19 11:08   141 H   111/59 


 


 19 11:06   122 H   110/55 


 


 19 11:05   117 H    100


 


 19 11:04     126/59 


 


 19 11:02   108 H   124/58 


 


 19 11:00   124 H   113/56  100


 


 19 10:58   100 H   117/58 


 


 19 10:56     109/58 


 


 19 10:55   101 H    100


 


 19 10:54   125 H   107/61 


 


 19 10:52   148 H   94/53 


 


 19 10:50   127 H   106/52  100


 


 19 10:48   121 H   98/44 


 


 19 10:45   130 H   97/46  99


 


 19 10:44   126 H   88/49 


 


 19 10:42   117 H   113/57 


 


 19 10:40   128 H   117/56  96


 


 19 10:36   112 H   119/62 


 


 19 10:35   118 H    97


 


 19 10:34   111 H   125/63 


 


 19 10:32   112 H   127/63 


 


 19 10:30   123 H   134/64  98


 


 19 10:28   105 H   134/61 


 


 19 10:25   95 H    97


 


 19 10:20   115 H    98


 


 19 10:15      100


 


 19 09:59   107 H    100


 


 19 09:54   103 H    98


 


 19 09:49   109 H    99


 


 19 09:44   111 H    97


 


 19 09:39   87    100


 


 19 09:34   101 H    100


 


 19 09:29   107 H    98


 


 19 09:18   100 H   133/74 








                                Intake and Output











 19





 23:59 07:59 15:59


 


Intake Total 2385.467 1240 


 


Output Total 525 350 


 


Balance 1860.467 890 


 


Intake:   


 


  IV 2385.467 1000 


 


    D5lr 1,000 ml @ 125 mls/  1000 





    hr IV AS DIRECT [facility] Rx#:   





    [id number]   


 


    PITOCin/NS 30 UNIT/500ML 35.467  





    30 units In 500 ml @ 4   





    mls/hr IV AS DIRECT [facility]   





    Rx#:[id number]   


 


  Intake, Free Water  240 


 


Output:   


 


  Urine 525 350 


 


    Indwelling 200  


 


    Indwelling Catheter  350 


 


Other:   


 


  Total, Output Amount  350 


 


  Estimated Blood Loss 800  














- Exam


Breasts: Present: normal


Cardiovascular: Present: Regular rate, Normal S1, Normal S2


Lungs: Present: Clear to auscultation


Abdomen: Present: normal appearance, soft


Uterus: Present: normal, firm


Extremities: Present: normal


Deep Tendon Reflex Grade: Normal +2


Incision: Present: dressed (CDI)





- Labs


Labs: 


                              Abnormal lab results











  19 Range/Units





  07:25 


 


Hgb  9.7 L  (10.1-14.3)  gm/dl

## 2019-04-24 NOTE — EVENT NOTE
Date: 04/24/19





pt seen and evaluated this at @ 0830. Pt c/o pain but hat it is controlled on 

meds. Incision c/d/i with dressing in place. Perirectal area on the left inner 

buttock with induration and iodoform wick in place. This was not removed at this

time. I d/w that I would have the Gen Sx team evaluate the area and make 

recommendations. Pt will con't ancef for now. No drainage was noted on exam. She

expressed understanding of plan of care and all questions were addressed and 

answered. Orders given to d/c jama cath and allow ambulation.

## 2019-04-24 NOTE — EVENT NOTE
Date: 04/24/19





Patient requesting change in pain medications. See orders. Dr. Carrera at [facility]

removing packing from abscess and replaced with new packing. Patient tolerated 

well. Will continue post op pathway.

## 2019-04-25 RX ADMIN — HYDROCODONE BITARTRATE AND ACETAMINOPHEN PRN EACH: 5; 325 TABLET ORAL at 09:33

## 2019-04-25 RX ADMIN — HYDROCODONE BITARTRATE AND ACETAMINOPHEN PRN EACH: 5; 325 TABLET ORAL at 01:55

## 2019-04-25 RX ADMIN — IBUPROFEN PRN MG: 600 TABLET, FILM COATED ORAL at 01:56

## 2019-04-25 RX ADMIN — HYDROCODONE BITARTRATE AND ACETAMINOPHEN PRN EACH: 5; 325 TABLET ORAL at 14:17

## 2019-04-25 RX ADMIN — IBUPROFEN PRN MG: 600 TABLET, FILM COATED ORAL at 19:56

## 2019-04-25 RX ADMIN — IBUPROFEN PRN MG: 600 TABLET, FILM COATED ORAL at 09:22

## 2019-04-25 RX ADMIN — HYDROCODONE BITARTRATE AND ACETAMINOPHEN PRN EACH: 5; 325 TABLET ORAL at 19:53

## 2019-04-25 RX ADMIN — SIMETHICONE PRN MG: 80 TABLET, CHEWABLE ORAL at 11:38

## 2019-04-25 RX ADMIN — Medication SCH EACH: at 11:38

## 2019-04-25 NOTE — PROGRESS NOTE
Assessment and Plan


 patient doing well, only c/o is gas pain and inability to pass gas at this 

time. VSSAF, H&H stable, no s/s anemia, pain well controlled. Dressing D&I - rn 

to assist with removal after shower today. Encouraged ambulation, change in 

position and pumping breast milk for infant in NICU. Continue current 

management. Anticipate d/c home tomorrow. 








- Patient Problems


(1) Abscess of perineum


Current Visit: Yes   Status: Acute   





(2)  delivery delivered


Current Visit: Yes   Status: Acute   





Subjective





- Subjective


Date of service: 19


Principal diagnosis: POD 2 s/p primary c/s, perianal abscess w/ packing


Patient reports: appetite normal, voiding normally, pain well controlled, 

ambulating normally, no dizzy ambulation, no flatus, no nauseated


: in NICU (encouraged to pump breastmilk)





Objective





- Vital Signs


Latest vital signs: 


                                   Vital Signs











  Temp Pulse Resp BP Pulse Ox


 


 19 02:23  97.6 F  78  20  138/67  96


 


 19 17:01  97.7 F  95 H  20  115/74  96


 


 19 09:04  97.9 F  82  18  133/77  98








                                Intake and Output











 19





 23:59 07:59 15:59


 


Intake Total 720 240 


 


Output Total 200  


 


Balance 520 240 


 


Intake:   


 


  Oral  240 


 


  Intake, Free Water 720  


 


Output:   


 


  Urine 200  


 


    Void 200  


 


Other:   


 


  Total, Intake Amount  240 


 


  Total, Output Amount 200  


 


  # Voids   


 


    Void  1 














- Exam


Breasts: Present: normal


Cardiovascular: Present: Regular rate


Lungs: Present: Clear to auscultation, Normal air movement


Abdomen: Present: normal appearance, soft


Vulva: both: normal (abcess on left, packed.)


Uterus: Present: normal, firm, fundal height at umbilicus


Extremities: Present: normal


Incision: Present: normal, dry, dressed (rn instructed to removed today after 

shower)

## 2019-04-25 NOTE — PROGRESS NOTE
Assessment and Plan





- Patient Problems


(1) Abscess of perineum


Current Visit: Yes   Status: Acute   


Plan to address problem: 


Pt stable. Appears to be healing well. No new issues. 





Will take a few weeks to completely heal. 





Rec: 


1) Sitz baths 2-3x/day


2) replace 1/4" packing tape in the abscess cavity


3) No abx needed unless patient is showing signs of sepsis. 


4) f/u in office 1 week after discharge





Time=10min








Subjective


Date of service: 04/25/19


Patient Reports: Positive: no new complaints, feels better





Objective


                               Vital Signs - 12hr











  04/25/19





  08:42


 


Temperature 97.6 F


 


Pulse Rate 87


 


Respiratory 16





Rate 


 


Blood Pressure 134/74





[Left] 


 


O2 Sat by Pulse 97





Oximetry 














- General physical appearance


no distress, no pain





- Respiratory


normal expansion, normal respiratory effort





- Psychiatric


oriented to time, oriented to person, oriented to place, speech is normal, 

memory intact





- Labs





                                 04/24/19 07:25 lab results/return to ED if symptoms worsen, persist or questions arise

## 2019-04-26 VITALS — SYSTOLIC BLOOD PRESSURE: 138 MMHG | DIASTOLIC BLOOD PRESSURE: 82 MMHG

## 2019-04-26 RX ADMIN — HYDROCODONE BITARTRATE AND ACETAMINOPHEN PRN EACH: 5; 325 TABLET ORAL at 10:28

## 2019-04-26 RX ADMIN — Medication SCH EACH: at 10:15

## 2019-04-26 RX ADMIN — SIMETHICONE PRN MG: 80 TABLET, CHEWABLE ORAL at 10:15

## 2019-04-26 RX ADMIN — IBUPROFEN PRN MG: 600 TABLET, FILM COATED ORAL at 07:04

## 2019-04-26 RX ADMIN — HYDROCODONE BITARTRATE AND ACETAMINOPHEN PRN EACH: 5; 325 TABLET ORAL at 15:15

## 2019-04-26 RX ADMIN — IBUPROFEN PRN MG: 600 TABLET, FILM COATED ORAL at 15:15

## 2019-04-26 RX ADMIN — HYDROCODONE BITARTRATE AND ACETAMINOPHEN PRN EACH: 5; 325 TABLET ORAL at 07:03

## 2019-04-26 RX ADMIN — HYDROCODONE BITARTRATE AND ACETAMINOPHEN PRN EACH: 5; 325 TABLET ORAL at 00:24

## 2019-04-26 NOTE — DISCHARGE SUMMARY
Providers





- Providers


Date of Admission: 


04/23/19 18:19





Date of discharge: 04/26/19


Attending physician: 


OSKAR BELCHER





                                        





04/23/19 21:00


Consult to Lactation Consultant [CONS] Routine 


   Reason For Exam: 











Primary care physician: 


OSKAR BELCHER








Hospitalization


Reason for admission: IOL, abscess


Condition: Good


Pertinent studies: 





post delivery h&h 9.7/31.2


Procedures: 





primary c/s for arrest of dilation/descent


Hospital course: 





uncomplicated c/s and post op course


Disposition: DC-01 TO HOME OR SELFCARE





Core Measure Documentation





- Palliative Care


Palliative Care/ Comfort Measures: Not Applicable





- Core Measures


Any of the following diagnoses?: none





Exam





- Constitutional


Vitals: 


                                        











Temp Pulse Resp BP Pulse Ox


 


 97.8 F   81   16   137/75   98 


 


 04/26/19 09:44  04/26/19 09:44  04/26/19 09:44  04/26/19 09:44  04/26/19 09:44











General appearance: Present: no acute distress, well-nourished





- EENT


Eyes: Present: PERRL


ENT: hearing intact, clear oral mucosa





- Neck


Neck: Present: supple, normal ROM





- Respiratory


Respiratory effort: normal


Respiratory: bilateral: CTA





- Cardiovascular


Rhythm: regular


Heart Sounds: Present: S1 & S2.  Absent: rub, click





- Extremities


Extremities: pulses symmetrical, No edema


Peripheral Pulses: within normal limits





- Abdominal


General gastrointestinal: Present: soft, non-tender, non-distended, normal bowel

 sounds


Female genitourinary: Present: normal





- Integumentary


Integumentary: Present: clear, warm, dry





- Musculoskeletal


Musculoskeletal: gait normal, strength equal bilaterally





- Psychiatric


Psychiatric: appropriate mood/affect, intact judgment & insight





- Neurologic


Neurologic: CNII-XII intact, moves all extremities





- Additional findings


Additional findings: 





fundus firm, ml, U/2, vaginal bleeding is minimal. Patient reports pain is well 

controlled with medications. abdominal incision is well-approximated, healing 

well, no bleeding or drainage, no s/s of infection. DWP good hygiene for wound. 

Abscess is covered at this time. Patient has supplies and instructions to pack 

abscess at home, she verbalizes knowledge on how to do so. She has f/u appt with

 Dr. Carrera's office on Wednesday. Pt will f/u for incision check at [facility] in 

1 week. Patient understands to call sooner with any change in assessment, 

questions or concers. VSSAF. Patient denies any dizziness or feeling faint with 

position changes or ambulation.  





Plan


Activity: advance as tolerated


Diet: regular


Wound: open to air, keep clean and dry, per your surgeon's advice


Follow up with: 


OSKAR BELCHER MD [Primary Care Provider] - 7 Days (Congratulations! Please 

call [phone] to schedule your incision check in 1 week. Please call with 

any questions or concerns. )


Prescriptions: 


Docusate Sodium [Colace] 100 mg PO BID PRN #30 capsule


 PRN Reason: Constipation


Ferrous Sulfate [Feosol 325 MG tab] 325 mg PO QDAY #30 tablet


Ibuprofen 800 mg PO Q6HR #30 tablet


oxyCODONE /ACETAMINOPHEN [Percocet 5/325] 1 tab PO Q4HR #30 tab